# Patient Record
Sex: MALE | Race: WHITE | NOT HISPANIC OR LATINO | Employment: OTHER | URBAN - METROPOLITAN AREA
[De-identification: names, ages, dates, MRNs, and addresses within clinical notes are randomized per-mention and may not be internally consistent; named-entity substitution may affect disease eponyms.]

---

## 2019-12-23 ENCOUNTER — OFFICE VISIT (OUTPATIENT)
Dept: URGENT CARE | Facility: CLINIC | Age: 69
End: 2019-12-23
Payer: MEDICARE

## 2019-12-23 VITALS
HEIGHT: 64 IN | RESPIRATION RATE: 20 BRPM | SYSTOLIC BLOOD PRESSURE: 124 MMHG | HEART RATE: 108 BPM | WEIGHT: 190 LBS | DIASTOLIC BLOOD PRESSURE: 76 MMHG | BODY MASS INDEX: 32.44 KG/M2 | TEMPERATURE: 98 F | OXYGEN SATURATION: 95 %

## 2019-12-23 DIAGNOSIS — R00.0 TACHYCARDIA: Primary | ICD-10-CM

## 2019-12-23 DIAGNOSIS — R05.9 COUGH: ICD-10-CM

## 2019-12-23 PROCEDURE — 93005 ELECTROCARDIOGRAM TRACING: CPT | Performed by: FAMILY MEDICINE

## 2019-12-23 PROCEDURE — 99203 OFFICE O/P NEW LOW 30 MIN: CPT | Performed by: FAMILY MEDICINE

## 2019-12-23 PROCEDURE — G0463 HOSPITAL OUTPT CLINIC VISIT: HCPCS | Performed by: FAMILY MEDICINE

## 2019-12-23 RX ORDER — BENZONATATE 100 MG/1
100 CAPSULE ORAL 3 TIMES DAILY PRN
Qty: 20 CAPSULE | Refills: 0 | Status: SHIPPED | OUTPATIENT
Start: 2019-12-23

## 2019-12-23 NOTE — PROGRESS NOTES
NAME: Tex Yu is a 71 y o  male  : 1950    MRN: [id number]      Assessment and Plan   Tachycardia [R00 0]  1  Tachycardia  ECG 12 lead   2  Cough  benzonatate (TESSALON PERLES) 100 mg capsule           Patient Instructions   Patient Instructions   F/u here as needed  Wells criteria- 4 pt- moderate probability of PE-  Hx of DVT, tachycardia, Prostate Ca  Patient just remembered that he is on eliquis  Recommend patient go to ER-  Patient refusing to go  Given risks/benefits  Cough is mostly likely viral   ECG-  Sinus tachy with PAC  LAD      Proceed to ER if symptoms worsen  Chief Complaint     Chief Complaint   Patient presents with    Cough     patient reports he has had a productive cough x 3 days along with post nasal drip  taking OTC medication with short term relief  History of Present Illness   Here c/o cough  3 days  worse laying down  Clear sputum  No fever or SOB  No calf pain  Hx of RLE DVT but no pain current  Denies CP or fevers  He is on eliquis for hx of DVT  Denies swollen legs  Review of Systems   Review of Systems   Constitutional: Negative for fever  Eyes: Negative for visual disturbance  Respiratory: Positive for cough  Negative for chest tightness and shortness of breath  Cardiovascular: Negative for chest pain  Gastrointestinal: Negative for abdominal pain, diarrhea, nausea and vomiting  Genitourinary: Negative for difficulty urinating and dysuria  Skin: Negative for rash and wound  Neurological: Negative for weakness and headaches  Current Medications       Current Outpatient Medications:     benzonatate (TESSALON PERLES) 100 mg capsule, Take 1 capsule (100 mg total) by mouth 3 (three) times a day as needed for cough, Disp: 20 capsule, Rfl: 0    Current Allergies     Allergies as of 2019    (No Known Allergies)              Past Medical History:   Diagnosis Date    Prostate cancer [facility])        History reviewed   No pertinent surgical history  History reviewed  No pertinent family history  Medications have been verified  The following portions of the patient's history were reviewed and updated as appropriate: allergies, current medications, past family history, past medical history, past social history, past surgical history and problem list     Objective   /76   Pulse (!) 108   Temp 98 °F (36 7 °C)   Resp 20   Ht 5' 4" (1 626 m)   Wt 86 2 kg (190 lb)   SpO2 95%   BMI 32 61 kg/m²      Physical Exam     Physical Exam   Constitutional: He is oriented to person, place, and time  He appears well-developed and well-nourished  HENT:   Head: Normocephalic  Eyes: EOM are normal    Neck: Normal range of motion  Cardiovascular: Regular rhythm and normal heart sounds  Exam reveals no gallop and no friction rub  No murmur heard  Tachycardia  Pulmonary/Chest: Effort normal and breath sounds normal  No respiratory distress  He has no wheezes  He has no rales  Abdominal: Soft  Bowel sounds are normal  He exhibits no distension  There is no tenderness  There is no rebound and no guarding  Musculoskeletal: Normal range of motion  No calf TTP or LE swelling  Neurological: He is oriented to person, place, and time  Skin: Skin is warm and dry  No rash noted  Psychiatric: He has a normal mood and affect  Thought content normal    Nursing note and vitals reviewed

## 2019-12-23 NOTE — PATIENT INSTRUCTIONS
F/u here as needed  Wells criteria- 4 pt- moderate probability of PE-  Hx of DVT, tachycardia, Prostate Ca  Patient just remembered that he is on eliquis  Recommend patient go to ER-  Patient refusing to go  Given risks/benefits  NO changing his mind  He will go to [facility]  Cough is mostly likely viral   ECG-  Sinus tachy with PAC   LAD

## 2019-12-26 LAB
ATRIAL RATE: 0 BPM
ATRIAL RATE: 107 BPM
P AXIS: 32 DEGREES
PR INTERVAL: 162 MS
QRS AXIS: -38 DEGREES
QRS AXIS: 0 DEGREES
QRSD INTERVAL: 0 MS
QRSD INTERVAL: 98 MS
QT INTERVAL: 0 MS
QT INTERVAL: 354 MS
QTC INTERVAL: 0 MS
QTC INTERVAL: 472 MS
T WAVE AXIS: 0 DEGREES
T WAVE AXIS: 8 DEGREES
VENTRICULAR RATE: 0 BPM
VENTRICULAR RATE: 107 BPM

## 2019-12-26 PROCEDURE — 93010 ELECTROCARDIOGRAM REPORT: CPT | Performed by: INTERNAL MEDICINE
